# Patient Record
Sex: MALE | Race: WHITE | NOT HISPANIC OR LATINO | Employment: OTHER | URBAN - NONMETROPOLITAN AREA
[De-identification: names, ages, dates, MRNs, and addresses within clinical notes are randomized per-mention and may not be internally consistent; named-entity substitution may affect disease eponyms.]

---

## 2019-04-23 ENCOUNTER — TRANSCRIBE ORDERS (OUTPATIENT)
Dept: PODIATRY | Facility: CLINIC | Age: 69
End: 2019-04-23

## 2019-04-23 DIAGNOSIS — E11.42 TYPE 2 DIABETES MELLITUS WITH POLYNEUROPATHY (HCC): ICD-10-CM

## 2019-04-23 DIAGNOSIS — L03.119 CELLULITIS OF FOOT: Primary | ICD-10-CM

## 2019-05-01 ENCOUNTER — OFFICE VISIT (OUTPATIENT)
Dept: PODIATRY | Facility: CLINIC | Age: 69
End: 2019-05-01

## 2019-05-01 ENCOUNTER — LAB (OUTPATIENT)
Dept: LAB | Facility: HOSPITAL | Age: 69
End: 2019-05-01

## 2019-05-01 VITALS — HEART RATE: 66 BPM | HEIGHT: 72 IN | OXYGEN SATURATION: 94 % | WEIGHT: 305.2 LBS | BODY MASS INDEX: 41.34 KG/M2

## 2019-05-01 DIAGNOSIS — S91.104A OPEN WOUND OF FIFTH TOE OF RIGHT FOOT, INITIAL ENCOUNTER: Primary | ICD-10-CM

## 2019-05-01 DIAGNOSIS — S91.104A OPEN WOUND OF FIFTH TOE OF RIGHT FOOT, INITIAL ENCOUNTER: ICD-10-CM

## 2019-05-01 DIAGNOSIS — E11.42 TYPE 2 DIABETES MELLITUS WITH PERIPHERAL NEUROPATHY (HCC): ICD-10-CM

## 2019-05-01 LAB
CRP SERPL-MCNC: 0.83 MG/DL (ref 0–0.5)
ERYTHROCYTE [SEDIMENTATION RATE] IN BLOOD: 34 MM/HR (ref 0–20)

## 2019-05-01 PROCEDURE — 85652 RBC SED RATE AUTOMATED: CPT

## 2019-05-01 PROCEDURE — 99203 OFFICE O/P NEW LOW 30 MIN: CPT | Performed by: PODIATRIST

## 2019-05-01 PROCEDURE — 11042 DBRDMT SUBQ TIS 1ST 20SQCM/<: CPT | Performed by: PODIATRIST

## 2019-05-01 PROCEDURE — 36415 COLL VENOUS BLD VENIPUNCTURE: CPT

## 2019-05-01 PROCEDURE — 86140 C-REACTIVE PROTEIN: CPT

## 2019-05-01 RX ORDER — POTASSIUM CHLORIDE 1500 MG/1
20 TABLET, FILM COATED, EXTENDED RELEASE ORAL DAILY
Refills: 0 | COMMUNITY
Start: 2019-04-01

## 2019-05-01 RX ORDER — DILTIAZEM HYDROCHLORIDE 240 MG/1
240 CAPSULE, COATED, EXTENDED RELEASE ORAL 2 TIMES DAILY
Refills: 2 | COMMUNITY
Start: 2019-04-01

## 2019-05-01 RX ORDER — TRIAMCINOLONE ACETONIDE 1 MG/G
CREAM TOPICAL
Refills: 2 | COMMUNITY
Start: 2019-04-22

## 2019-05-01 RX ORDER — ROSUVASTATIN CALCIUM 10 MG/1
10 TABLET, COATED ORAL DAILY
Refills: 1 | COMMUNITY
Start: 2019-04-22

## 2019-05-01 RX ORDER — ATENOLOL 50 MG/1
50 TABLET ORAL 2 TIMES DAILY
Refills: 1 | COMMUNITY
Start: 2019-03-09

## 2019-05-01 RX ORDER — GLIPIZIDE 10 MG/1
10 TABLET ORAL DAILY
Refills: 2 | COMMUNITY
Start: 2019-01-29

## 2019-05-01 RX ORDER — FUROSEMIDE 20 MG/1
20 TABLET ORAL DAILY
Refills: 2 | COMMUNITY
Start: 2019-03-29

## 2019-05-01 NOTE — PROGRESS NOTES
Gilmer Parks  1950  68 y.o. male   JOHN Rojas - 4/15/2019  BS - 123 per pt    Patient presents today for diabetic foot care and a wound on his right foot. He just finished a round of bactrim and cipro.    05/01/2019    Chief Complaint   Patient presents with   • Left Foot - diabetic foot care   • Right Foot - diabetic foot care, Wound Check       History of Present Illness    Gilmer Parks is a 68 y.o.male who presents to clinic today with chief complaint of a wound in between his right fourth and fifth toes.  Patient states that he first noticed the wound almost 4 weeks ago.  He was seen for it by his primary care doctor who placed him on antibiotics due to redness, swelling and drainage.  The redness, swelling and drainage have all but resolved.  The wound still remains.  Wife is doing daily dressing changes and cleansing.  He denies any injuries to the area.  He does admit to be in the type II diabetic.  He states his blood sugars are well controlled.  He relates to numbness in both of his feet.  He has no other pedal complaints today.      Past Medical History:   Diagnosis Date   • Clotting disorder (CMS/[facility])    • Diabetes mellitus (CMS/[facility])    • Hypertension          History reviewed. No pertinent surgical history.      Family History   Problem Relation Age of Onset   • Diabetes Mother    • Hypertension Mother    • Diabetes Sister    • Hypertension Sister    • Diabetes Brother    • Hypertension Brother        No Known Allergies    Social History     Socioeconomic History   • Marital status:      Spouse name: Not on file   • Number of children: Not on file   • Years of education: Not on file   • Highest education level: Not on file   Tobacco Use   • Smoking status: Never Smoker   • Smokeless tobacco: Never Used   Substance and Sexual Activity   • Alcohol use: No     Frequency: Never   • Drug use: Defer   • Sexual activity: Defer         Current Outpatient Medications   Medication Sig Dispense  "Refill   • atenolol (TENORMIN) 50 MG tablet Take 50 mg by mouth 2 (Two) Times a Day.  1   • diltiaZEM CD (CARDIZEM CD) 240 MG 24 hr capsule Take 240 mg by mouth 2 (Two) Times a Day.  2   • furosemide (LASIX) 20 MG tablet Take 20 mg by mouth Daily.  2   • glipiZIDE (GLUCOTROL) 10 MG tablet Take 10 mg by mouth Daily.  2   • metFORMIN (GLUCOPHAGE) 1000 MG tablet Take 1,000 mg by mouth 2 (Two) Times a Day With Meals.  0   • mupirocin (BACTROBAN) 2 % ointment APPLY TO AFFECTED AREA TWICE A DAY  2   • nystatin-triamcinolone (MYCOLOG II) 603105-5.1 UNIT/GM-% cream APPLY TO AFFECTED AREA TWICE A DAY  0   • potassium chloride ER (K-TAB) 20 MEQ tablet controlled-release ER tablet Take 20 mEq by mouth Daily. with food  0   • rosuvastatin (CRESTOR) 10 MG tablet Take 10 mg by mouth Daily.  1   • triamcinolone (KENALOG) 0.1 % cream APPLY TO AFFECTED AREA TWICE A DAY FOR 10 DAYS  2     No current facility-administered medications for this visit.        Review of Systems   Constitutional: Negative.    HENT: Negative.    Eyes: Negative.    Respiratory: Positive for shortness of breath.    Cardiovascular: Positive for leg swelling.   Gastrointestinal: Negative.    Endocrine: Negative.    Genitourinary: Negative.    Musculoskeletal: Negative.    Skin: Positive for wound. Negative for color change.   Neurological: Positive for numbness. Negative for dizziness.   Psychiatric/Behavioral: Negative.          OBJECTIVE    Pulse 66   Ht 182.9 cm (72\")   Wt (!) 138 kg (305 lb 3.2 oz)   SpO2 94%   BMI 41.39 kg/m²       Physical Exam   Constitutional: He is oriented to person, place, and time. He appears well-developed and well-nourished.   HENT:   Head: Normocephalic and atraumatic.   Nose: Nose normal.   Eyes: Conjunctivae and EOM are normal. Pupils are equal, round, and reactive to light.   Pulmonary/Chest: Effort normal. No respiratory distress. He has no wheezes.    Gilmer had a diabetic foot exam performed today.  Neurological: He is " alert and oriented to person, place, and time. He displays normal reflexes.   Skin: Skin is warm and dry. Capillary refill takes less than 2 seconds.   Psychiatric: He has a normal mood and affect. His behavior is normal.   Vitals reviewed.        Lower Extremity    Cardiovascular:    DP/PT pulses palpable bilateral  CFT brisk  to all digits  Skin temp is warm to warm from proximal tibia to distal digits bilateral  Pedal hair growth diminished.   Edema noted bilateral lower extremity's.  No erythema noted.    Musculoskeletal:  Muscle strength is 5/5 for all muscle groups tested   ROM of the 1st MTP is WNL bilateral   ROM of the MTJ is WNL bilateral   ROM of the STJ is WNL bilateral   ROM of the ankle joint is  WNL bilateral     Dermatological:   Full-thickness open wound noted to the right fourth webspace.  Wound measures 0.2 x 0.1 x 0.3 cm.  Slight maceration noted.  No foul odor or purulent drainage.  Wound does not probe to bone.  No subcutaneous nodules or masses noted    Nails 1-5 bilateral are within normal limits for length     Neurological:   Protective sensation absent  Sensation decreased to light touch        Procedures        ASSESSMENT AND PLAN    Gilmer was seen today for diabetic foot care, diabetic foot care and wound check.    Diagnoses and all orders for this visit:    Open wound of fifth toe of right foot, initial encounter  -     XR Foot 3+ View Right  -     C-reactive Protein; Future  -     Sedimentation Rate; Future    Type 2 diabetes mellitus with peripheral neuropathy (CMS/HCC)      - A diabetic foot screening exam was performed and the patient was educated on the foot complications related to diabetes,  preventative foot care and what signs and symptoms to watch for.  Instructed to contact our office if any foot problems develop before next visit.  -Wound noted in objective was excisionally debrided with a dermal curette down to subcutaneous tissue.  Post debridement the wound measures 0.3 x  0.3 x 0.3 cm.  Wound was dressed.  Patient instructed on daily dressing changes and cleansing.  -X-rays taken and reviewed.  -ESR and CRP ordered  - All the patients questions were answered.  - RTC 1 week           This document has been electronically signed by Johnny Martin DPM on May 1, 2019 1:00 PM     5/1/2019  1:00 PM

## 2019-05-08 ENCOUNTER — OFFICE VISIT (OUTPATIENT)
Dept: PODIATRY | Facility: CLINIC | Age: 69
End: 2019-05-08

## 2019-05-08 VITALS — BODY MASS INDEX: 41.31 KG/M2 | HEART RATE: 63 BPM | WEIGHT: 305 LBS | OXYGEN SATURATION: 98 % | HEIGHT: 72 IN

## 2019-05-08 DIAGNOSIS — S91.104D OPEN WOUND OF FIFTH TOE OF RIGHT FOOT, SUBSEQUENT ENCOUNTER: Primary | ICD-10-CM

## 2019-05-08 PROCEDURE — 11042 DBRDMT SUBQ TIS 1ST 20SQCM/<: CPT | Performed by: PODIATRIST

## 2019-05-08 NOTE — PROGRESS NOTES
Gilmer FUNK Kasey  1950  68 y.o. male   JOHN Rojas - 4/15/2019  BS - 138 per pt    Patient presents today for diabetic foot care and a wound on his right foot.    05/08/2019    Chief Complaint   Patient presents with   • Left Foot - diabetic nail care   • Right Foot - diabetic nail care       History of Present Illness    Patient presents to clinic today for follow-up of his right foot wound.  They have been dressing the area daily as instructed.  No new complaints today.    Past Medical History:   Diagnosis Date   • Clotting disorder (CMS/HCC)    • Diabetes mellitus (CMS/HCC)    • Hypertension          History reviewed. No pertinent surgical history.      Family History   Problem Relation Age of Onset   • Diabetes Mother    • Hypertension Mother    • Diabetes Sister    • Hypertension Sister    • Diabetes Brother    • Hypertension Brother        No Known Allergies    Social History     Socioeconomic History   • Marital status:      Spouse name: Not on file   • Number of children: Not on file   • Years of education: Not on file   • Highest education level: Not on file   Tobacco Use   • Smoking status: Never Smoker   • Smokeless tobacco: Never Used   Substance and Sexual Activity   • Alcohol use: No     Frequency: Never   • Drug use: Defer   • Sexual activity: Defer         Current Outpatient Medications   Medication Sig Dispense Refill   • atenolol (TENORMIN) 50 MG tablet Take 50 mg by mouth 2 (Two) Times a Day.  1   • diltiaZEM CD (CARDIZEM CD) 240 MG 24 hr capsule Take 240 mg by mouth 2 (Two) Times a Day.  2   • furosemide (LASIX) 20 MG tablet Take 20 mg by mouth Daily.  2   • glipiZIDE (GLUCOTROL) 10 MG tablet Take 10 mg by mouth Daily.  2   • metFORMIN (GLUCOPHAGE) 1000 MG tablet Take 1,000 mg by mouth 2 (Two) Times a Day With Meals.  0   • mupirocin (BACTROBAN) 2 % ointment APPLY TO AFFECTED AREA TWICE A DAY  2   • nystatin-triamcinolone (MYCOLOG II) 411511-0.1 UNIT/GM-% cream APPLY TO AFFECTED AREA  "TWICE A DAY  0   • potassium chloride ER (K-TAB) 20 MEQ tablet controlled-release ER tablet Take 20 mEq by mouth Daily. with food  0   • rosuvastatin (CRESTOR) 10 MG tablet Take 10 mg by mouth Daily.  1   • triamcinolone (KENALOG) 0.1 % cream APPLY TO AFFECTED AREA TWICE A DAY FOR 10 DAYS  2     No current facility-administered medications for this visit.        Review of Systems   Constitutional: Negative.    HENT: Negative.    Eyes: Negative.    Respiratory: Positive for shortness of breath.    Cardiovascular: Positive for leg swelling.   Gastrointestinal: Negative.    Musculoskeletal: Negative.    Skin: Positive for wound. Negative for color change.   Neurological: Positive for numbness. Negative for dizziness.   Psychiatric/Behavioral: Negative.          OBJECTIVE    Pulse 63   Ht 182.9 cm (72\")   Wt (!) 138 kg (305 lb)   SpO2 98%   BMI 41.37 kg/m²       Physical Exam   Constitutional: He is oriented to person, place, and time. He appears well-developed and well-nourished. No distress.   HENT:   Head: Normocephalic and atraumatic.   Eyes: Conjunctivae and EOM are normal. Pupils are equal, round, and reactive to light.   Pulmonary/Chest: Effort normal. No respiratory distress. He has no wheezes.   Neurological: He is alert and oriented to person, place, and time.   Skin: Skin is warm and dry. Capillary refill takes less than 2 seconds.   Psychiatric: He has a normal mood and affect. His behavior is normal.   Vitals reviewed.        Lower Extremity    Cardiovascular:    DP/PT pulses palpable bilateral  CFT brisk  to all digits  Skin temp is warm to warm from proximal tibia to distal digits bilateral  Pedal hair growth diminished.   Edema noted bilateral lower extremity's.  No erythema noted.    Musculoskeletal:  Muscle strength is 5/5 for all muscle groups tested   ROM of the 1st MTP is WNL bilateral   ROM of the MTJ is WNL bilateral   ROM of the STJ is WNL bilateral   ROM of the ankle joint is  WNL bilateral "     Dermatological:   Full-thickness open wound noted to the right fourth webspace.  Wound measures 0.2 x 0.2 x 0.2 cm.  No maceration noted.  No foul odor or purulent drainage.  Wound does not probe to bone.  No subcutaneous nodules or masses noted    Nails 1-5 bilateral are within normal limits for length     Neurological:   Protective sensation absent  Sensation decreased to light touch        Procedures        ASSESSMENT AND PLAN    Gilmer was seen today for diabetic nail care and diabetic nail care.    Diagnoses and all orders for this visit:    Open wound of fifth toe of right foot, subsequent encounter      -Wound is improving.  -Wound noted in objective was excisionally debrided with a dermal curette down to subcutaneous tissue.  Post debridement the wound measures 0.3 x 0.2 x 0.2 cm.  Wound was dressed. Continue daily dressing changes  -ESR and CRP slightly elevated  - All the patients questions were answered.  - RTC 1 week           This document has been electronically signed by Johnny Martin DPM on May 8, 2019 12:29 PM     5/8/2019  12:29 PM

## 2019-05-15 ENCOUNTER — OFFICE VISIT (OUTPATIENT)
Dept: PODIATRY | Facility: CLINIC | Age: 69
End: 2019-05-15

## 2019-05-15 VITALS — BODY MASS INDEX: 41.31 KG/M2 | HEART RATE: 57 BPM | OXYGEN SATURATION: 94 % | HEIGHT: 72 IN | WEIGHT: 305 LBS

## 2019-05-15 DIAGNOSIS — S91.104D OPEN WOUND OF FIFTH TOE OF RIGHT FOOT, SUBSEQUENT ENCOUNTER: Primary | ICD-10-CM

## 2019-05-15 PROCEDURE — 99213 OFFICE O/P EST LOW 20 MIN: CPT | Performed by: PODIATRIST

## 2019-05-15 NOTE — PROGRESS NOTES
Gilmer FUNK Kasey  1950  68 y.o. male   OJHN Rojas - 4/15/2019  BS - 118 per pt    Patient presents today for wound on his right foot.    05/15/2019     Chief Complaint   Patient presents with   • Right Foot - Wound Check       History of Present Illness    Patient presents to clinic today for follow-up of his right foot wound.  They have been dressing the area daily as instructed.  No new complaints today.    Past Medical History:   Diagnosis Date   • Clotting disorder (CMS/HCC)    • Diabetes mellitus (CMS/HCC)    • Hypertension          History reviewed. No pertinent surgical history.      Family History   Problem Relation Age of Onset   • Diabetes Mother    • Hypertension Mother    • Diabetes Sister    • Hypertension Sister    • Diabetes Brother    • Hypertension Brother        No Known Allergies    Social History     Socioeconomic History   • Marital status:      Spouse name: Not on file   • Number of children: Not on file   • Years of education: Not on file   • Highest education level: Not on file   Tobacco Use   • Smoking status: Never Smoker   • Smokeless tobacco: Never Used   Substance and Sexual Activity   • Alcohol use: No     Frequency: Never   • Drug use: Defer   • Sexual activity: Defer         Current Outpatient Medications   Medication Sig Dispense Refill   • atenolol (TENORMIN) 50 MG tablet Take 50 mg by mouth 2 (Two) Times a Day.  1   • diltiaZEM CD (CARDIZEM CD) 240 MG 24 hr capsule Take 240 mg by mouth 2 (Two) Times a Day.  2   • furosemide (LASIX) 20 MG tablet Take 20 mg by mouth Daily.  2   • glipiZIDE (GLUCOTROL) 10 MG tablet Take 10 mg by mouth Daily.  2   • metFORMIN (GLUCOPHAGE) 1000 MG tablet Take 1,000 mg by mouth 2 (Two) Times a Day With Meals.  0   • mupirocin (BACTROBAN) 2 % ointment APPLY TO AFFECTED AREA TWICE A DAY  2   • nystatin-triamcinolone (MYCOLOG II) 856965-3.1 UNIT/GM-% cream APPLY TO AFFECTED AREA TWICE A DAY  0   • potassium chloride ER (K-TAB) 20 MEQ tablet  "controlled-release ER tablet Take 20 mEq by mouth Daily. with food  0   • rosuvastatin (CRESTOR) 10 MG tablet Take 10 mg by mouth Daily.  1   • triamcinolone (KENALOG) 0.1 % cream APPLY TO AFFECTED AREA TWICE A DAY FOR 10 DAYS  2     No current facility-administered medications for this visit.        Review of Systems   Constitutional: Negative.    HENT: Negative.    Eyes: Negative.    Respiratory: Positive for shortness of breath.    Cardiovascular: Positive for leg swelling.   Gastrointestinal: Negative.    Musculoskeletal: Negative.    Skin: Positive for wound. Negative for color change.   Neurological: Positive for numbness. Negative for dizziness.   Psychiatric/Behavioral: Negative.          OBJECTIVE    Pulse 57   Ht 182.9 cm (72\")   Wt (!) 138 kg (305 lb)   SpO2 94%   BMI 41.37 kg/m²       Physical Exam   Constitutional: He is oriented to person, place, and time. He appears well-developed and well-nourished. No distress.   HENT:   Head: Normocephalic and atraumatic.   Eyes: Conjunctivae and EOM are normal. Pupils are equal, round, and reactive to light.   Pulmonary/Chest: Effort normal. No respiratory distress. He has no wheezes.   Neurological: He is alert and oriented to person, place, and time.   Skin: Skin is warm and dry. Capillary refill takes less than 2 seconds.   Psychiatric: He has a normal mood and affect. His behavior is normal.   Vitals reviewed.        Lower Extremity    Cardiovascular:    DP/PT pulses palpable bilateral  CFT brisk  to all digits  Skin temp is warm to warm from proximal tibia to distal digits bilateral  Pedal hair growth diminished.   Edema noted bilateral lower extremity's.  No erythema noted.    Musculoskeletal:  Muscle strength is 5/5 for all muscle groups tested   ROM of the 1st MTP is WNL bilateral   ROM of the MTJ is WNL bilateral   ROM of the STJ is WNL bilateral   ROM of the ankle joint is  WNL bilateral     Dermatological:   Full-thickness open wound noted to the " right fourth webspace.  Wound measures 0.2 x 0.1 x 0.1 cm.  No maceration noted.  No foul odor or purulent drainage.  Wound does not probe to bone.  No subcutaneous nodules or masses noted    Nails 1-5 bilateral are within normal limits for length     Neurological:   Protective sensation absent  Sensation decreased to light touch        Procedures        ASSESSMENT AND PLAN    Gilmer was seen today for wound check.    Diagnoses and all orders for this visit:    Open wound of fifth toe of right foot, subsequent encounter      -Wound continues to improve.  -  Continue daily dressing changes  - All the patients questions were answered.  - RTC 2 weeks           This document has been electronically signed by Johnny Martin DPM on May 15, 2019 12:25 PM     5/15/2019  12:25 PM

## 2019-06-03 ENCOUNTER — OFFICE VISIT (OUTPATIENT)
Dept: PODIATRY | Facility: CLINIC | Age: 69
End: 2019-06-03

## 2019-06-03 VITALS — HEART RATE: 61 BPM | BODY MASS INDEX: 41.31 KG/M2 | OXYGEN SATURATION: 97 % | HEIGHT: 72 IN | WEIGHT: 305 LBS

## 2019-06-03 DIAGNOSIS — S91.104D OPEN WOUND OF FIFTH TOE OF RIGHT FOOT, SUBSEQUENT ENCOUNTER: Primary | ICD-10-CM

## 2019-06-03 PROCEDURE — 99212 OFFICE O/P EST SF 10 MIN: CPT | Performed by: PODIATRIST

## 2019-06-03 RX ORDER — TIOTROPIUM BROMIDE AND OLODATEROL 3.124; 2.736 UG/1; UG/1
SPRAY, METERED RESPIRATORY (INHALATION)
Refills: 3 | COMMUNITY
Start: 2019-05-20

## 2019-06-03 NOTE — PROGRESS NOTES
Gilmer FUNK Kasey  1950  68 y.o. male   JOHN Rojas - 4/15/2019  BS - 125 per pt    Patient presents today for recheck of the wound on his right foot.    06/03/2019     Chief Complaint   Patient presents with   • Right Foot - Follow-up, Wound Check       History of Present Illness    Patient presents to clinic today for follow-up of his right foot wound.       Past Medical History:   Diagnosis Date   • Clotting disorder (CMS/HCC)    • Diabetes mellitus (CMS/HCC)    • Hypertension          History reviewed. No pertinent surgical history.      Family History   Problem Relation Age of Onset   • Diabetes Mother    • Hypertension Mother    • Diabetes Sister    • Hypertension Sister    • Diabetes Brother    • Hypertension Brother        No Known Allergies    Social History     Socioeconomic History   • Marital status:      Spouse name: Not on file   • Number of children: Not on file   • Years of education: Not on file   • Highest education level: Not on file   Tobacco Use   • Smoking status: Never Smoker   • Smokeless tobacco: Never Used   Substance and Sexual Activity   • Alcohol use: No     Frequency: Never   • Drug use: Defer   • Sexual activity: Defer         Current Outpatient Medications   Medication Sig Dispense Refill   • atenolol (TENORMIN) 50 MG tablet Take 50 mg by mouth 2 (Two) Times a Day.  1   • diltiaZEM CD (CARDIZEM CD) 240 MG 24 hr capsule Take 240 mg by mouth 2 (Two) Times a Day.  2   • furosemide (LASIX) 20 MG tablet Take 20 mg by mouth Daily.  2   • glipiZIDE (GLUCOTROL) 10 MG tablet Take 10 mg by mouth Daily.  2   • metFORMIN (GLUCOPHAGE) 1000 MG tablet Take 1,000 mg by mouth 2 (Two) Times a Day With Meals.  0   • mupirocin (BACTROBAN) 2 % ointment APPLY TO AFFECTED AREA TWICE A DAY  2   • nystatin-triamcinolone (MYCOLOG II) 797268-6.1 UNIT/GM-% cream APPLY TO AFFECTED AREA TWICE A DAY  0   • potassium chloride ER (K-TAB) 20 MEQ tablet controlled-release ER tablet Take 20 mEq by mouth  "Daily. with food  0   • rosuvastatin (CRESTOR) 10 MG tablet Take 10 mg by mouth Daily.  1   • triamcinolone (KENALOG) 0.1 % cream APPLY TO AFFECTED AREA TWICE A DAY FOR 10 DAYS  2   • STIOLTO RESPIMAT 2.5-2.5 MCG/ACT aerosol solution inhaler INHALE 2 PUFFS EVERY DAY  3     No current facility-administered medications for this visit.        Review of Systems   Constitutional: Negative.    HENT: Negative.    Eyes: Negative.    Respiratory: Positive for shortness of breath.    Cardiovascular: Positive for leg swelling.   Gastrointestinal: Negative.    Endocrine: Negative.    Genitourinary: Negative.    Musculoskeletal: Negative.    Skin: Positive for wound.   Neurological: Positive for numbness.         OBJECTIVE    Pulse 61   Ht 182.9 cm (72\")   Wt (!) 138 kg (305 lb)   SpO2 97%   BMI 41.37 kg/m²       Physical Exam   Constitutional: He is oriented to person, place, and time. He appears well-developed and well-nourished. No distress.   HENT:   Head: Normocephalic and atraumatic.   Eyes: Conjunctivae and EOM are normal. Pupils are equal, round, and reactive to light.   Pulmonary/Chest: Effort normal. No respiratory distress. He has no wheezes.   Neurological: He is alert and oriented to person, place, and time.   Skin: Skin is warm and dry. Capillary refill takes less than 2 seconds.   Psychiatric: He has a normal mood and affect. His behavior is normal.   Vitals reviewed.        Right lower Extremity    Cardiovascular:    DP/PT pulses palpable    CFT brisk  to all digits  Skin temp is warm to warm from proximal tibia to distal digits    Pedal hair growth diminished.   Edema noted   lower extremity's.  No erythema noted.    Musculoskeletal:  Muscle strength is 5/5 for all muscle groups tested   ROM of the 1st MTP is WNL    ROM of the MTJ is WNL    ROM of the STJ is WNL    ROM of the ankle joint is  WNL      Dermatological:   Open wound noted to right fourth webspace.  No signs of infection  No subcutaneous nodules " or masses noted    Nails 1-5  are within normal limits for length     Neurological:   Protective sensation absent  Sensation decreased to light touch        Procedures        ASSESSMENT AND PLAN    Gilmre was seen today for follow-up and wound check.    Diagnoses and all orders for this visit:    Open wound of fifth toe of right foot, subsequent encounter      -Wound continues to improve.  -  Continue daily dressing changes  - All the patients questions were answered.  - RTC 4 weeks           This document has been electronically signed by Johnny Martin DPM on Cary 3, 2019 10:42 AM     6/3/2019  10:42 AM

## 2019-07-03 ENCOUNTER — OFFICE VISIT (OUTPATIENT)
Dept: PODIATRY | Facility: CLINIC | Age: 69
End: 2019-07-03

## 2019-07-03 VITALS — WEIGHT: 305 LBS | HEIGHT: 72 IN | OXYGEN SATURATION: 98 % | HEART RATE: 65 BPM | BODY MASS INDEX: 41.31 KG/M2

## 2019-07-03 DIAGNOSIS — S91.104D OPEN WOUND OF FIFTH TOE OF RIGHT FOOT, SUBSEQUENT ENCOUNTER: Primary | ICD-10-CM

## 2019-07-03 PROCEDURE — 99212 OFFICE O/P EST SF 10 MIN: CPT | Performed by: PODIATRIST

## 2019-07-03 NOTE — PROGRESS NOTES
Gilmer FUNK Kasey  1950  69 y.o. male   JOHN Rojas - 4/15/2019  BS - 131 per pt    Patient presents today for recheck of the wound on his right foot.    07/03/2019     Chief Complaint   Patient presents with   • Right Foot - Follow-up, Wound Check       History of Present Illness    Patient presents to clinic today for follow-up of his right foot wound. He denies pain today.  He denies any pedal complaints    Past Medical History:   Diagnosis Date   • Clotting disorder (CMS/HCC)    • Diabetes mellitus (CMS/HCC)    • Hypertension          History reviewed. No pertinent surgical history.      Family History   Problem Relation Age of Onset   • Diabetes Mother    • Hypertension Mother    • Diabetes Sister    • Hypertension Sister    • Diabetes Brother    • Hypertension Brother        No Known Allergies    Social History     Socioeconomic History   • Marital status:      Spouse name: Not on file   • Number of children: Not on file   • Years of education: Not on file   • Highest education level: Not on file   Tobacco Use   • Smoking status: Never Smoker   • Smokeless tobacco: Never Used   Substance and Sexual Activity   • Alcohol use: No     Frequency: Never   • Drug use: Defer   • Sexual activity: Defer         Current Outpatient Medications   Medication Sig Dispense Refill   • atenolol (TENORMIN) 50 MG tablet Take 50 mg by mouth 2 (Two) Times a Day.  1   • diltiaZEM CD (CARDIZEM CD) 240 MG 24 hr capsule Take 240 mg by mouth 2 (Two) Times a Day.  2   • furosemide (LASIX) 20 MG tablet Take 20 mg by mouth Daily.  2   • glipiZIDE (GLUCOTROL) 10 MG tablet Take 10 mg by mouth Daily.  2   • metFORMIN (GLUCOPHAGE) 1000 MG tablet Take 1,000 mg by mouth 2 (Two) Times a Day With Meals.  0   • mupirocin (BACTROBAN) 2 % ointment APPLY TO AFFECTED AREA TWICE A DAY  2   • nystatin-triamcinolone (MYCOLOG II) 664281-4.1 UNIT/GM-% cream APPLY TO AFFECTED AREA TWICE A DAY  0   • potassium chloride ER (K-TAB) 20 MEQ tablet  "controlled-release ER tablet Take 20 mEq by mouth Daily. with food  0   • rosuvastatin (CRESTOR) 10 MG tablet Take 10 mg by mouth Daily.  1   • STIOLTO RESPIMAT 2.5-2.5 MCG/ACT aerosol solution inhaler INHALE 2 PUFFS EVERY DAY  3   • triamcinolone (KENALOG) 0.1 % cream APPLY TO AFFECTED AREA TWICE A DAY FOR 10 DAYS  2     No current facility-administered medications for this visit.        Review of Systems   Constitutional: Negative.    HENT: Negative.    Respiratory: Negative.    Musculoskeletal: Negative.    Skin: Negative.    Psychiatric/Behavioral: Negative.          OBJECTIVE    Pulse 65   Ht 182.9 cm (72\")   Wt (!) 138 kg (305 lb)   SpO2 98%   BMI 41.37 kg/m²       Physical Exam   Constitutional: He is oriented to person, place, and time. He appears well-developed and well-nourished. No distress.   HENT:   Head: Normocephalic and atraumatic.   Nose: Nose normal.   Pulmonary/Chest: Effort normal. No respiratory distress.   Neurological: He is alert and oriented to person, place, and time.   Skin: Skin is warm and dry. Capillary refill takes less than 2 seconds.   Psychiatric: He has a normal mood and affect. His behavior is normal.   Vitals reviewed.        Right lower Extremity    Cardiovascular:    DP/PT pulses palpable    CFT brisk  to all digits  Skin temp is warm to warm from proximal tibia to distal digits    Pedal hair growth diminished.   Edema noted   lower extremity's.  No erythema noted.    Musculoskeletal:  Muscle strength is 5/5 for all muscle groups tested   ROM of the 1st MTP is WNL    ROM of the MTJ is WNL    ROM of the STJ is WNL    ROM of the ankle joint is  WNL      Dermatological:   No open wounds.  No subcutaneous nodules or masses noted    Nails 1-5  are within normal limits for length     Neurological:   Protective sensation absent  Sensation decreased to light touch        Procedures        ASSESSMENT AND PLAN    Gilmer was seen today for follow-up and wound check.    Diagnoses and " all orders for this visit:    Open wound of fifth toe of right foot, subsequent encounter      -Wound has resolved.  Okay to discontinue dressing changes.  - All the patients questions were answered.  - RTC 6 months           This document has been electronically signed by Johnny Martin DPM on July 3, 2019 9:54 AM     7/3/2019  9:54 AM

## 2022-08-01 ENCOUNTER — OFFICE VISIT (OUTPATIENT)
Dept: PULMONOLOGY | Facility: CLINIC | Age: 72
End: 2022-08-01

## 2022-08-01 VITALS
HEIGHT: 72 IN | WEIGHT: 290 LBS | HEART RATE: 67 BPM | OXYGEN SATURATION: 95 % | DIASTOLIC BLOOD PRESSURE: 82 MMHG | SYSTOLIC BLOOD PRESSURE: 130 MMHG | BODY MASS INDEX: 39.28 KG/M2

## 2022-08-01 DIAGNOSIS — G47.33 OBSTRUCTIVE SLEEP APNEA: ICD-10-CM

## 2022-08-01 DIAGNOSIS — J98.6 DIAPHRAGMATIC PARALYSIS: Primary | ICD-10-CM

## 2022-08-01 DIAGNOSIS — I48.0 PAROXYSMAL ATRIAL FIBRILLATION: ICD-10-CM

## 2022-08-01 DIAGNOSIS — E66.01 SEVERE OBESITY (BMI 35.0-39.9) WITH COMORBIDITY: ICD-10-CM

## 2022-08-01 PROCEDURE — 99204 OFFICE O/P NEW MOD 45 MIN: CPT | Performed by: INTERNAL MEDICINE

## 2022-08-01 RX ORDER — ATORVASTATIN CALCIUM 20 MG/1
TABLET, FILM COATED ORAL
COMMUNITY
Start: 2022-06-28

## 2022-08-01 RX ORDER — LISINOPRIL 5 MG/1
5 TABLET ORAL DAILY
COMMUNITY
Start: 2022-07-05

## 2022-08-01 RX ORDER — SILODOSIN 4 MG/1
4 CAPSULE ORAL EVERY EVENING
COMMUNITY
Start: 2022-05-23

## 2022-08-01 RX ORDER — LEVOTHYROXINE SODIUM 0.05 MG/1
50 TABLET ORAL
COMMUNITY
Start: 2022-07-05

## 2022-08-01 RX ORDER — ASPIRIN 81 MG/1
81 TABLET ORAL DAILY
COMMUNITY

## 2022-08-01 NOTE — ASSESSMENT & PLAN NOTE
He will continue with his current positive airway pressure device and I told him we will get a download in 1 to 2 months.  He is just been on his new device for about a month.

## 2022-08-01 NOTE — PATIENT INSTRUCTIONS
The patient has a history of a paralyzed right hemidiaphragm.  The exact cause has not been determined but it may relate to a diabetic neuropathy.  I did tell him we will have him return for pulmonary functions in about 4 weeks.  Also he has sleep apnea and recently got a new CPAP device and I told him we will check a download sometime in the next several weeks.  We will hold off on any follow-up imaging studies for now.  He is using Breztri Aerosphere and thinks it is helping him somewhat and again we will assess him for any component of airflow obstruction when he returns for pulmonary functions in about 4 weeks.

## 2022-08-01 NOTE — PROGRESS NOTES
Chief Complaint  Shortness of Breath and disorders of diaphragm    Subjective    History of Present Illness     Gilmer Parks presents to [facility] for shortness of breath and a paralyzed right hemidiaphragm.    History of Present Illness   The patient is referred by SHELIA Luo, regarding shortness of breath.  He has a history of a paralyzed right hemidiaphragm which was diagnosed 4 to 5 years ago.  He had been seen by pulmonologist in [place].  The etiology of the paralysis has not been determined as there is no history of any chest or neck surgery or trauma which may have contributed to this.  He is diabetic and certainly he could have a neuropathy involving the right phrenic nerve.  I did tell the patient and his wife who accompanies him today that if the paralysis is been present for this period of time and almost already will not resolve.  Also I told him that diaphragmatic plication surgery can be performed but this generally has minimal benefits in terms of improving any symptoms of shortness of breath and has significant morbidity associated including chronic pain.  I told him I would definitely not recommend this.  He recently was started on the [email or web address] and states this has helped him somewhat.  I would suspect he most likely has restrictive disease on the basis of his diaphragmatic paralysis but if he had any coexisting obstruction and certainly the eLamaztri may help him.  I told him we will get a pulmonary function study on his return visit in several weeks to better assess this.  I told him we could also get a chest CT but he states he has had prior imaging studies and not interested in pursuing any additional studies at this time.  He has sleep apnea and recently did get a new CPAP machine and at present states he is doing well with this after having some adjustments of the pressures.  He [facility] this through [facility].   I did tell him we can get a download at some point.  In the short-term I will have him return in about 4 weeks with complete pulmonary functions.  He has had the COVID-19 including 2 boosters in the form of the Moderna vaccine, had the flu shot this past flu season, and has had a Pneumovax.  Prior to Admission medications    Medication Sig Start Date End Date Taking? Authorizing Provider   apixaban (ELIQUIS) 5 MG tablet tablet Take 1 tablet by mouth 2 (Two) Times a Day. 4/25/22  Yes Ramakrishna Watkins MD   aspirin 81 MG EC tablet Take 81 mg by mouth Daily.   Yes Ramakrishna Watkins MD   atenolol (TENORMIN) 50 MG tablet Take 50 mg by mouth 2 (Two) Times a Day. 3/9/19  Yes Ramakrishna Watkins MD   atorvastatin (LIPITOR) 20 MG tablet TAKE 1 TABLET BY MOUTH EVERY DAY FOR 90 DAYS 6/28/22  Yes Ramakrishna Watkins MD   Budeson-Glycopyrrol-Formoterol (BREZTRI AEROSPHERE IN) Inhale.   Yes Ramakrishna Watkins MD   furosemide (LASIX) 20 MG tablet Take 20 mg by mouth Daily. 3/29/19  Yes Ramakrishna Watkins MD   glipiZIDE (GLUCOTROL) 10 MG tablet Take 10 mg by mouth Daily. 1/29/19  Yes Ramakrishna Watkins MD   levothyroxine (SYNTHROID, LEVOTHROID) 50 MCG tablet Take 50 mcg by mouth Every Morning Before Breakfast. 7/5/22  Yes Ramakrishna Watkins MD   lisinopril (PRINIVIL,ZESTRIL) 5 MG tablet Take 5 mg by mouth Daily. 7/5/22  Yes Ramakrishna Watkins MD   silodosin (RAPAFLO) 4 MG capsule capsule Take 4 mg by mouth Every Evening. 5/23/22  Yes Ramakrishna Watkins MD   diltiaZEM CD (CARDIZEM CD) 240 MG 24 hr capsule Take 240 mg by mouth 2 (Two) Times a Day. 4/1/19   Ramakrishna Watkins MD   metFORMIN (GLUCOPHAGE) 1000 MG tablet Take 1,000 mg by mouth 2 (Two) Times a Day With Meals. 2/8/19   Ramakrishna Watkins MD   mupirocin (BACTROBAN) 2 % ointment APPLY TO AFFECTED AREA TWICE A DAY 4/22/19   Ramakrishna Watkins MD   nystatin-triamcinolone (MYCOLOG II) 021476-5.1 UNIT/GM-% cream APPLY TO AFFECTED AREA TWICE  "A DAY 4/22/19   Ramakrishna Watkins MD   potassium chloride ER (K-TAB) 20 MEQ tablet controlled-release ER tablet Take 20 mEq by mouth Daily. with food 4/1/19   Ramakrishna Watkins MD   rosuvastatin (CRESTOR) 10 MG tablet Take 10 mg by mouth Daily. 4/22/19   Ramakrishna Watkins MD   STIOLTO RESPIMAT 2.5-2.5 MCG/ACT aerosol solution inhaler INHALE 2 PUFFS EVERY DAY 5/20/19   Ramakrishna Watkins MD   triamcinolone (KENALOG) 0.1 % cream APPLY TO AFFECTED AREA TWICE A DAY FOR 10 DAYS 4/22/19   Ramakrishna Watkins MD       Social History     Socioeconomic History   • Marital status:    Tobacco Use   • Smoking status: Never Smoker   • Smokeless tobacco: Never Used   Substance and Sexual Activity   • Alcohol use: No   • Drug use: Defer   • Sexual activity: Defer       Objective   Vital Signs:   /82   Pulse 67   Ht 182.9 cm (72\")   Wt 132 kg (290 lb)   SpO2 95% Comment: RA  BMI 39.33 kg/m²     Physical Exam  Vitals and nursing note reviewed.   Constitutional:       Appearance: He is obese.   HENT:      Head: Normocephalic.      Comments: He is wearing a mask.  Eyes:      Extraocular Movements: Extraocular movements intact.      Pupils: Pupils are equal, round, and reactive to light.   Neck:      Comments: His neck is thick.  Cardiovascular:      Rate and Rhythm: Normal rate and regular rhythm.   Pulmonary:      Effort: Pulmonary effort is normal.      Comments: Breath sounds are diminished particularly at the right base.  Abdominal:      Comments: His abdomen is obese.   Musculoskeletal:         General: Normal range of motion.   Skin:     General: Skin is warm and dry.   Neurological:      General: No focal deficit present.      Mental Status: He is alert and oriented to person, place, and time.   Psychiatric:         Mood and Affect: Mood normal.         Behavior: Behavior normal.        Result Review :          No results found for this or any previous visit.                 My interpretation " of imaging:    XR CHEST PORTABLE (08/16/2021 12:32 EDT)  CTA PULMONARY W CONTRAST (10/08/2018 18:05 EDT)        Assessment and Plan     Diagnoses and all orders for this visit:    1. Diaphragmatic paralysis (Primary)  Assessment & Plan:  In the absence of any definite cause this certainly could relate to neuropathy involving the right phrenic nerve related to his diabetes.  I will get pulmonary functions on return.  He is already may continue his [employer] Aerosphere if it seems to help him particularly if he has any coexisting airflow obstruction.    Orders:  -     Full Pulmonary Function Test Without Bronchodilator; Future    2. Obstructive sleep apnea  Assessment & Plan:  He will continue with his current positive airway pressure device and I told him we will get a download in 1 to 2 months.  He is just been on his new device for about a month.      3. Paroxysmal atrial fibrillation (HCC)  Assessment & Plan:  His rhythm is regular today.      4. Severe obesity (BMI 35.0-39.9) with comorbidity (HCC)  Assessment & Plan:  Patient's (Body mass index is 39.33 kg/m².) indicates that they are morbidly obese (BMI > 40 or > 35 with obesity - related health condition) with health conditions that include obstructive sleep apnea . Weight is newly identified.  Diet and exercise are encouraged and he will follow-up with his primary healthcare provider regarding his elevated BMI otherwise.            Delgado Pacheco MD  8/1/2022  18:02 CDT    Follow Up   Return in 4 weeks (on 8/29/2022) for Complete PFT, To see me specifically.    Patient was given instructions and counseling regarding his condition or for health maintenance advice. Please see specific information pulled into the AVS if appropriate.

## 2022-08-01 NOTE — ASSESSMENT & PLAN NOTE
In the absence of any definite cause this certainly could relate to neuropathy involving the right phrenic nerve related to his diabetes.  I will get pulmonary functions on return.  He is already may continue his [employer] if it seems to help him particularly if he has any coexisting airflow obstruction.

## 2022-08-01 NOTE — ASSESSMENT & PLAN NOTE
Patient's (Body mass index is 39.33 kg/m².) indicates that they are morbidly obese (BMI > 40 or > 35 with obesity - related health condition) with health conditions that include obstructive sleep apnea . Weight is newly identified.  Diet and exercise are encouraged and he will follow-up with his primary healthcare provider regarding his elevated BMI otherwise.

## 2022-08-25 ENCOUNTER — LAB (OUTPATIENT)
Dept: LAB | Facility: HOSPITAL | Age: 72
End: 2022-08-25

## 2022-08-25 DIAGNOSIS — Z01.818 PREOP TESTING: Primary | ICD-10-CM

## 2022-08-25 LAB — SARS-COV-2 ORF1AB RESP QL NAA+PROBE: NOT DETECTED

## 2022-08-25 PROCEDURE — U0004 COV-19 TEST NON-CDC HGH THRU: HCPCS

## 2022-08-25 PROCEDURE — U0005 INFEC AGEN DETEC AMPLI PROBE: HCPCS

## 2022-08-25 PROCEDURE — C9803 HOPD COVID-19 SPEC COLLECT: HCPCS

## 2022-08-29 ENCOUNTER — OFFICE VISIT (OUTPATIENT)
Dept: PULMONOLOGY | Facility: CLINIC | Age: 72
End: 2022-08-29

## 2022-08-29 VITALS
WEIGHT: 291.4 LBS | OXYGEN SATURATION: 97 % | DIASTOLIC BLOOD PRESSURE: 84 MMHG | HEIGHT: 71 IN | BODY MASS INDEX: 40.8 KG/M2 | SYSTOLIC BLOOD PRESSURE: 142 MMHG | HEART RATE: 68 BPM

## 2022-08-29 DIAGNOSIS — Z20.822 ENCOUNTER FOR PREOPERATIVE SCREENING LABORATORY TESTING FOR COVID-19 VIRUS: ICD-10-CM

## 2022-08-29 DIAGNOSIS — G47.33 OBSTRUCTIVE SLEEP APNEA: ICD-10-CM

## 2022-08-29 DIAGNOSIS — J41.0 SIMPLE CHRONIC BRONCHITIS: ICD-10-CM

## 2022-08-29 DIAGNOSIS — J98.6 DIAPHRAGMATIC PARALYSIS: ICD-10-CM

## 2022-08-29 DIAGNOSIS — Z01.812 ENCOUNTER FOR PREOPERATIVE SCREENING LABORATORY TESTING FOR COVID-19 VIRUS: ICD-10-CM

## 2022-08-29 DIAGNOSIS — J98.4 RESTRICTIVE LUNG DISEASE: Primary | ICD-10-CM

## 2022-08-29 DIAGNOSIS — E66.01 MORBID OBESITY WITH BMI OF 40.0-44.9, ADULT: ICD-10-CM

## 2022-08-29 PROCEDURE — 94010 BREATHING CAPACITY TEST: CPT | Performed by: INTERNAL MEDICINE

## 2022-08-29 PROCEDURE — 94729 DIFFUSING CAPACITY: CPT | Performed by: INTERNAL MEDICINE

## 2022-08-29 PROCEDURE — 94727 GAS DIL/WSHOT DETER LNG VOL: CPT | Performed by: INTERNAL MEDICINE

## 2022-08-29 PROCEDURE — 99214 OFFICE O/P EST MOD 30 MIN: CPT | Performed by: INTERNAL MEDICINE

## 2022-08-29 RX ORDER — BUDESONIDE, GLYCOPYRROLATE, AND FORMOTEROL FUMARATE 160; 9; 4.8 UG/1; UG/1; UG/1
2 AEROSOL, METERED RESPIRATORY (INHALATION) 2 TIMES DAILY
Qty: 4 EACH | Refills: 0 | COMMUNITY
Start: 2022-08-29

## 2022-08-29 NOTE — ASSESSMENT & PLAN NOTE
This is the major contributing factor to his restrictive lung disease although his weight is also playing a role.

## 2022-08-29 NOTE — PROCEDURES
Full Pulmonary Function Test Without Bronchodilator  Performed by: Lupe Lester, RRT  Authorized by: Delgado Pacheco MD      Pre Drug % Predicted    FVC: 39%   FEV1: 43%   FEF 25-75%: 59%   FEV1/FVC: 83%   T%   RV: 88%   DLCO: 85%   D/VAsb: 168%    Interpretation   Spirometry   Spirometry shows severe restriction. There is reduced midflow suggesting small airway/airflow obstruction.   Lung Volume Measurements  Measurements show reduced lung volumes consistent with restriction.   Diffusion Capacity  The patient's diffusion capacity is normal.  Diffusion capacity is normal when corrected for alveolar volume.   Overall comments: The patient's spirometry is consistent with a severe restrictive ventilatory defect with a coexisting decrease in midflows.  Lung volumes confirm a severe restrictive ventilatory defect.  There is also a decrease in inspiratory capacity.  Diffusion capacity is within normal limits and when corrected for alveolar volume is actually supranormal.

## 2022-08-29 NOTE — ASSESSMENT & PLAN NOTE
Patient's (Body mass index is 41.22 kg/m².) indicates that they are morbidly obese (BMI > 40 or > 35 with obesity - related health condition) with health conditions that include obstructive sleep apnea . Weight is unchanged.  Diet and exercise are encouraged he will follow-up with his primary healthcare provider regarding his elevated BMI otherwise.

## 2022-08-29 NOTE — ASSESSMENT & PLAN NOTE
He may continue the [employer] and samples were provided.  He is instructed not to use this with Stiolto.

## 2022-08-29 NOTE — PATIENT INSTRUCTIONS
The patient's pulmonary functions do show a severe restrictive defect.  He does have a decrease in midflows as well and as the [employer]osphere does seem to have helped him symptomatically I gave him additional samples.  We can certainly try to prescribe this in the future although I am not sure how well it would be covered.  Again I will try to get a download of his CPAP compliance from [facility].  Otherwise I will see him back in 6 months with pulmonary functions.

## 2022-08-29 NOTE — PROGRESS NOTES
Chief Complaint  diaphragmatic paralysis    Subjective    History of Present Illness     Gilmer Parks presents to [facility] for diaphragmatic paralysis and shortness of breath.    History of Present Illness   The patient is accompanied by his wife today.  Pulmonary functions are performed and he has a severe restrictive ventilatory defect on spirometry which is confirmed on lung volumes.  When corrected for alveolar volume his diffusion capacity is actually supranormal.  I clearly think his restriction relates to combination of his weight and paralyzed hemidiaphragm rather than parenchymal lung disease based on the diffusion capacity actually being supranormal when corrected for alveolar volume.  He does think the Breztri Aerosphere helps him and his spirometry did show a decrease in mid flows so he may continue this but I told him I am not sure if will be well covered on insurance.  I provided him additional samples.  I did tell him we will see him back in 6 months with follow-up pulmonary functions.  I will try to get a download of his CPAP compliance data from  [facility].  He has had his COVID-19 vaccine including 2 boosters in the form of the Moderna vaccine, he had his flu shot this past flu season, and has had a Pneumovax previously.  Prior to Admission medications    Medication Sig Start Date End Date Taking? Authorizing Provider   apixaban (ELIQUIS) 5 MG tablet tablet Take 1 tablet by mouth 2 (Two) Times a Day. 4/25/22  Yes Provider, MD Ramakrishna   aspirin 81 MG EC tablet Take 81 mg by mouth Daily.   Yes Provider, MD Ramakrishna   atenolol (TENORMIN) 50 MG tablet Take 50 mg by mouth 2 (Two) Times a Day. 3/9/19  Yes Provider, MD Ramakrishna   atorvastatin (LIPITOR) 20 MG tablet TAKE 1 TABLET BY MOUTH EVERY DAY FOR 90 DAYS 6/28/22  Yes Provider, MD Ramakrishna   Budeson-Glycopyrrol-Formoterol (BREZTRI AEROSPHERE IN) Inhale.   Yes Provider,  MD Ramakrishna   diltiaZEM CD (CARDIZEM CD) 240 MG 24 hr capsule Take 240 mg by mouth 2 (Two) Times a Day. 4/1/19  Yes Provider, MD Ramakrishna   furosemide (LASIX) 20 MG tablet Take 20 mg by mouth Daily. 3/29/19  Yes Provider, MD Ramakrishna   glipiZIDE (GLUCOTROL) 10 MG tablet Take 10 mg by mouth Daily. 1/29/19  Yes Provider, MD Ramakrishna   levothyroxine (SYNTHROID, LEVOTHROID) 50 MCG tablet Take 50 mcg by mouth Every Morning Before Breakfast. 7/5/22  Yes Provider, MD Ramakrishna   lisinopril (PRINIVIL,ZESTRIL) 5 MG tablet Take 5 mg by mouth Daily. 7/5/22  Yes Ramakrishna Watkins MD   metFORMIN (GLUCOPHAGE) 1000 MG tablet Take 1,000 mg by mouth 2 (Two) Times a Day With Meals. 2/8/19  Yes Provider, MD Ramakrishna   mupirocin (BACTROBAN) 2 % ointment APPLY TO AFFECTED AREA TWICE A DAY 4/22/19  Yes Provider, MD Ramakrishna   nystatin-triamcinolone (MYCOLOG II) 427197-8.1 UNIT/GM-% cream APPLY TO AFFECTED AREA TWICE A DAY 4/22/19  Yes Provider, MD Ramakrishna   potassium chloride ER (K-TAB) 20 MEQ tablet controlled-release ER tablet Take 20 mEq by mouth Daily. with food 4/1/19  Yes Provider, MD Ramakrishna   rosuvastatin (CRESTOR) 10 MG tablet Take 10 mg by mouth Daily. 4/22/19  Yes Provider, MD Ramakrishna   silodosin (RAPAFLO) 4 MG capsule capsule Take 4 mg by mouth Every Evening. 5/23/22  Yes Provider, MD Ramakrishna   STIOLTO RESPIMAT 2.5-2.5 MCG/ACT aerosol solution inhaler INHALE 2 PUFFS EVERY DAY 5/20/19  Yes Provider, MD Ramakrishna   triamcinolone (KENALOG) 0.1 % cream APPLY TO AFFECTED AREA TWICE A DAY FOR 10 DAYS 4/22/19  Yes Provider, MD Ramakrishna   Budeson-Glycopyrrol-Formoterol (Breztri Aerosphere) 160-9-4.8 MCG/ACT aerosol inhaler Inhale 2 puffs 2 (Two) Times a Day. Rinse and spit after using. 8/29/22   Delgado Pacheco MD       Social History     Socioeconomic History   • Marital status:    Tobacco Use   • Smoking status: Never Smoker   • Smokeless tobacco: Never Used   Substance and  "Sexual Activity   • Alcohol use: No   • Drug use: Defer   • Sexual activity: Defer       Objective   Vital Signs:   /84   Pulse 68   Ht 179.1 cm (70.5\")   Wt 132 kg (291 lb 6.4 oz)   SpO2 97% Comment: RA  BMI 41.22 kg/m²     Physical Exam  Vitals and nursing note reviewed.   Constitutional:       Appearance: He is obese.   HENT:      Head: Normocephalic.      Comments: He is wearing a mask.  Eyes:      Extraocular Movements: Extraocular movements intact.      Pupils: Pupils are equal, round, and reactive to light.   Neck:      Comments: His neck is thick.  Cardiovascular:      Rate and Rhythm: Normal rate and regular rhythm.   Pulmonary:      Effort: Pulmonary effort is normal.      Comments: Breath sounds are diminished particularly at the right base.  Abdominal:      Comments: His abdomen is obese.   Musculoskeletal:         General: Normal range of motion.   Skin:     General: Skin is warm and dry.   Neurological:      General: No focal deficit present.      Mental Status: He is alert and oriented to person, place, and time.   Psychiatric:         Mood and Affect: Mood normal.         Behavior: Behavior normal.        Result Review :    PFT Values        Some values may be hidden. Unless noted otherwise, only the newest values recorded on each date are displayed.         Old Values PFT Results 22   No data to display.      Pre Drug PFT Results 22   FVC 39   FEV1 43   FEF 25-75% 59   FEV1/FVC 83      Post Drug PFT Results 22   No data to display.      Other Tests PFT Results 22   TLC 56   RV 88   DLCO 85   D/VAsb 168               Results for orders placed in visit on 22    Full Pulmonary Function Test Without Bronchodilator    Narrative  Full Pulmonary Function Test Without Bronchodilator  Performed by: Lupe Lester, RRT  Authorized by: Delgado Pacheco MD    Pre Drug % Predicted  FVC: 39%  FEV1: 43%  FEF 25-75%: 59%  FEV1/FVC: 83%  T%  RV: 88%  DLCO: " 85%  D/VAsb: 168%    Interpretation  Spirometry  Spirometry shows severe restriction. There is reduced midflow suggesting small airway/airflow obstruction.  Lung Volume Measurements  Measurements show reduced lung volumes consistent with restriction.  Diffusion Capacity  The patient's diffusion capacity is normal.  Diffusion capacity is normal when corrected for alveolar volume.  Overall comments: The patient's spirometry is consistent with a severe restrictive ventilatory defect with a coexisting decrease in midflows.  Lung volumes confirm a severe restrictive ventilatory defect.  There is also a decrease in inspiratory capacity.  Diffusion capacity is within normal limits and when corrected for alveolar volume is actually supranormal.                     My interpretation of labs:   COVID-19,APTIMA PANTHER,PAD IN-HOUSE,NP/OP/NASAL SWAB IN UTM/VTM/SALINE/LIQUID AMIES TRANSPORT MEDIA/NP WASH OR ASPIRATE, 24 HR TAT - Swab, Nasopharynx (08/25/2022 10:24)      Assessment and Plan     Diagnoses and all orders for this visit:    1. Restrictive lung disease (Primary)  Assessment & Plan:  This is related to his paralyzed diaphragm and also to his weight.    Orders:  -     Full Pulmonary Function Test Without Bronchodilator; Future    2. Diaphragmatic paralysis  Assessment & Plan:  This is the major contributing factor to his restrictive lung disease although his weight is also playing a role.      3. Obstructive sleep apnea  Assessment & Plan:  Again we will try to get a download of his compliance data from [facility] in [facility].      4. Simple chronic bronchitis (HCC)  Assessment & Plan:  He may continue the Breztri Aerosphere and samples were provided.  He is instructed not to use this with Stiolto.    Orders:  -     Budeson-Glycopyrrol-Formoterol (Breztri Aerosphere) 160-9-4.8 MCG/ACT aerosol inhaler; Inhale 2 puffs 2 (Two) Times a Day. Rinse and spit after using.  Dispense: 4 each; Refill: 0  -     Full  Pulmonary Function Test Without Bronchodilator; Future    5. Morbid obesity with BMI of 40.0-44.9, adult ([facility])  Assessment & Plan:  Patient's (Body mass index is 41.22 kg/m².) indicates that they are morbidly obese (BMI > 40 or > 35 with obesity - related health condition) with health conditions that include obstructive sleep apnea . Weight is unchanged.  Diet and exercise are encouraged he will follow-up with his primary healthcare provider regarding his elevated BMI otherwise.      6. Encounter for preoperative screening laboratory testing for COVID-19 virus  Assessment & Plan:  His COVID test was negative.    Orders:  -     COVID PRE-OP / PRE-PROCEDURE SCREENING ORDER (NO ISOLATION) - Swab, Nasal Cavity; Future          Delgado Pacheco MD  8/29/2022  18:36 CDT    Follow Up   Return in about 6 months (around 2/28/2023) for To see me specifically, Complete PFT.    Patient was given instructions and counseling regarding his condition or for health maintenance advice. Please see specific information pulled into the AVS if appropriate.

## 2022-08-29 NOTE — ASSESSMENT & PLAN NOTE
Again we will try to get a download of his compliance data from [facility] in [facility].